# Patient Record
Sex: MALE | Race: WHITE | Employment: FULL TIME | URBAN - METROPOLITAN AREA
[De-identification: names, ages, dates, MRNs, and addresses within clinical notes are randomized per-mention and may not be internally consistent; named-entity substitution may affect disease eponyms.]

---

## 2018-05-30 ENCOUNTER — HOSPITAL ENCOUNTER (EMERGENCY)
Age: 53
Discharge: HOME | End: 2018-05-31
Attending: EMERGENCY MEDICINE | Admitting: INTERNAL MEDICINE
Payer: COMMERCIAL

## 2018-05-30 DIAGNOSIS — K22.2 ESOPHAGEAL OBSTRUCTION DUE TO FOOD IMPACTION: Primary | ICD-10-CM

## 2018-05-30 DIAGNOSIS — T18.128A ESOPHAGEAL OBSTRUCTION DUE TO FOOD IMPACTION: Primary | ICD-10-CM

## 2018-05-30 PROCEDURE — 2500000003 HC RX 250 WO HCPCS: Performed by: EMERGENCY MEDICINE

## 2018-05-30 PROCEDURE — S0028 INJECTION, FAMOTIDINE, 20 MG: HCPCS | Performed by: EMERGENCY MEDICINE

## 2018-05-30 PROCEDURE — 96374 THER/PROPH/DIAG INJ IV PUSH: CPT

## 2018-05-30 PROCEDURE — 96375 TX/PRO/DX INJ NEW DRUG ADDON: CPT

## 2018-05-30 PROCEDURE — 99284 EMERGENCY DEPT VISIT MOD MDM: CPT

## 2018-05-30 PROCEDURE — 6360000002 HC RX W HCPCS: Performed by: EMERGENCY MEDICINE

## 2018-05-30 RX ORDER — MORPHINE SULFATE 8 MG/ML
6 INJECTION, SOLUTION INTRAMUSCULAR; INTRAVENOUS ONCE
Status: COMPLETED | OUTPATIENT
Start: 2018-05-30 | End: 2018-05-30

## 2018-05-30 RX ORDER — ONDANSETRON 2 MG/ML
4 INJECTION INTRAMUSCULAR; INTRAVENOUS ONCE
Status: COMPLETED | OUTPATIENT
Start: 2018-05-30 | End: 2018-05-30

## 2018-05-30 RX ADMIN — GLUCAGON HYDROCHLORIDE 1 MG: KIT at 22:10

## 2018-05-30 RX ADMIN — ONDANSETRON 4 MG: 2 INJECTION INTRAMUSCULAR; INTRAVENOUS at 23:46

## 2018-05-30 RX ADMIN — MORPHINE SULFATE 6 MG: 8 INJECTION INTRAVENOUS at 23:46

## 2018-05-30 RX ADMIN — FAMOTIDINE 20 MG: 10 INJECTION, SOLUTION INTRAVENOUS at 22:10

## 2018-05-30 ASSESSMENT — PAIN SCALES - GENERAL
PAINLEVEL_OUTOF10: 8
PAINLEVEL_OUTOF10: 8

## 2018-05-31 ENCOUNTER — ANESTHESIA (OUTPATIENT)
Dept: OPERATING ROOM | Age: 53
End: 2018-05-31
Payer: COMMERCIAL

## 2018-05-31 ENCOUNTER — ANESTHESIA EVENT (OUTPATIENT)
Dept: OPERATING ROOM | Age: 53
End: 2018-05-31
Payer: COMMERCIAL

## 2018-05-31 VITALS
HEART RATE: 58 BPM | RESPIRATION RATE: 16 BRPM | DIASTOLIC BLOOD PRESSURE: 99 MMHG | OXYGEN SATURATION: 97 % | BODY MASS INDEX: 25.05 KG/M2 | WEIGHT: 175 LBS | SYSTOLIC BLOOD PRESSURE: 153 MMHG | HEIGHT: 70 IN | TEMPERATURE: 97 F

## 2018-05-31 VITALS — SYSTOLIC BLOOD PRESSURE: 127 MMHG | DIASTOLIC BLOOD PRESSURE: 66 MMHG | OXYGEN SATURATION: 100 %

## 2018-05-31 PROCEDURE — 3600007502: Performed by: INTERNAL MEDICINE

## 2018-05-31 PROCEDURE — 2580000003 HC RX 258: Performed by: NURSE ANESTHETIST, CERTIFIED REGISTERED

## 2018-05-31 PROCEDURE — 7100000011 HC PHASE II RECOVERY - ADDTL 15 MIN: Performed by: INTERNAL MEDICINE

## 2018-05-31 PROCEDURE — 3600007512: Performed by: INTERNAL MEDICINE

## 2018-05-31 PROCEDURE — 6360000002 HC RX W HCPCS: Performed by: NURSE ANESTHETIST, CERTIFIED REGISTERED

## 2018-05-31 PROCEDURE — 7100000000 HC PACU RECOVERY - FIRST 15 MIN: Performed by: INTERNAL MEDICINE

## 2018-05-31 PROCEDURE — 7100000001 HC PACU RECOVERY - ADDTL 15 MIN: Performed by: INTERNAL MEDICINE

## 2018-05-31 PROCEDURE — 7100000010 HC PHASE II RECOVERY - FIRST 15 MIN: Performed by: INTERNAL MEDICINE

## 2018-05-31 PROCEDURE — 3700000000 HC ANESTHESIA ATTENDED CARE: Performed by: INTERNAL MEDICINE

## 2018-05-31 PROCEDURE — C1773 RET DEV, INSERTABLE: HCPCS | Performed by: INTERNAL MEDICINE

## 2018-05-31 PROCEDURE — 3700000001 HC ADD 15 MINUTES (ANESTHESIA): Performed by: INTERNAL MEDICINE

## 2018-05-31 RX ORDER — OXYCODONE HYDROCHLORIDE AND ACETAMINOPHEN 5; 325 MG/1; MG/1
1 TABLET ORAL
Status: DISCONTINUED | OUTPATIENT
Start: 2018-05-31 | End: 2018-05-31 | Stop reason: HOSPADM

## 2018-05-31 RX ORDER — ONDANSETRON 2 MG/ML
INJECTION INTRAMUSCULAR; INTRAVENOUS PRN
Status: DISCONTINUED | OUTPATIENT
Start: 2018-05-31 | End: 2018-05-31 | Stop reason: SDUPTHER

## 2018-05-31 RX ORDER — DIPHENHYDRAMINE HYDROCHLORIDE 50 MG/ML
12.5 INJECTION INTRAMUSCULAR; INTRAVENOUS
Status: DISCONTINUED | OUTPATIENT
Start: 2018-05-31 | End: 2018-05-31 | Stop reason: HOSPADM

## 2018-05-31 RX ORDER — MEPERIDINE HYDROCHLORIDE 25 MG/ML
12.5 INJECTION INTRAMUSCULAR; INTRAVENOUS; SUBCUTANEOUS EVERY 5 MIN PRN
Status: DISCONTINUED | OUTPATIENT
Start: 2018-05-31 | End: 2018-05-31 | Stop reason: HOSPADM

## 2018-05-31 RX ORDER — DEXAMETHASONE SODIUM PHOSPHATE 4 MG/ML
INJECTION, SOLUTION INTRA-ARTICULAR; INTRALESIONAL; INTRAMUSCULAR; INTRAVENOUS; SOFT TISSUE PRN
Status: DISCONTINUED | OUTPATIENT
Start: 2018-05-31 | End: 2018-05-31 | Stop reason: SDUPTHER

## 2018-05-31 RX ORDER — PROMETHAZINE HYDROCHLORIDE 25 MG/ML
6.25 INJECTION, SOLUTION INTRAMUSCULAR; INTRAVENOUS
Status: DISCONTINUED | OUTPATIENT
Start: 2018-05-31 | End: 2018-05-31 | Stop reason: HOSPADM

## 2018-05-31 RX ORDER — FENTANYL CITRATE 50 UG/ML
50 INJECTION, SOLUTION INTRAMUSCULAR; INTRAVENOUS EVERY 5 MIN PRN
Status: DISCONTINUED | OUTPATIENT
Start: 2018-05-31 | End: 2018-05-31 | Stop reason: HOSPADM

## 2018-05-31 RX ORDER — FENTANYL CITRATE 50 UG/ML
25 INJECTION, SOLUTION INTRAMUSCULAR; INTRAVENOUS EVERY 5 MIN PRN
Status: DISCONTINUED | OUTPATIENT
Start: 2018-05-31 | End: 2018-05-31 | Stop reason: HOSPADM

## 2018-05-31 RX ORDER — PANTOPRAZOLE SODIUM 40 MG/1
40 TABLET, DELAYED RELEASE ORAL DAILY
Qty: 30 TABLET | Refills: 3 | Status: SHIPPED | OUTPATIENT
Start: 2018-05-31 | End: 2018-11-15 | Stop reason: ALTCHOICE

## 2018-05-31 RX ORDER — FENTANYL CITRATE 50 UG/ML
INJECTION, SOLUTION INTRAMUSCULAR; INTRAVENOUS PRN
Status: DISCONTINUED | OUTPATIENT
Start: 2018-05-31 | End: 2018-05-31 | Stop reason: SDUPTHER

## 2018-05-31 RX ORDER — SODIUM CHLORIDE, SODIUM LACTATE, POTASSIUM CHLORIDE, CALCIUM CHLORIDE 600; 310; 30; 20 MG/100ML; MG/100ML; MG/100ML; MG/100ML
INJECTION, SOLUTION INTRAVENOUS CONTINUOUS PRN
Status: DISCONTINUED | OUTPATIENT
Start: 2018-05-31 | End: 2018-05-31 | Stop reason: SDUPTHER

## 2018-05-31 RX ORDER — PROPOFOL 10 MG/ML
INJECTION, EMULSION INTRAVENOUS PRN
Status: DISCONTINUED | OUTPATIENT
Start: 2018-05-31 | End: 2018-05-31 | Stop reason: SDUPTHER

## 2018-05-31 RX ADMIN — SODIUM CHLORIDE, POTASSIUM CHLORIDE, SODIUM LACTATE AND CALCIUM CHLORIDE: 600; 310; 30; 20 INJECTION, SOLUTION INTRAVENOUS at 01:35

## 2018-05-31 RX ADMIN — ONDANSETRON 4 MG: 2 INJECTION, SOLUTION INTRAMUSCULAR; INTRAVENOUS at 01:36

## 2018-05-31 RX ADMIN — FENTANYL CITRATE 100 MCG: 50 INJECTION, SOLUTION INTRAMUSCULAR; INTRAVENOUS at 01:36

## 2018-05-31 RX ADMIN — PROPOFOL 200 MG: 10 INJECTION, EMULSION INTRAVENOUS at 01:36

## 2018-05-31 RX ADMIN — DEXAMETHASONE SODIUM PHOSPHATE 8 MG: 4 INJECTION, SOLUTION INTRA-ARTICULAR; INTRALESIONAL; INTRAMUSCULAR; INTRAVENOUS; SOFT TISSUE at 01:36

## 2018-05-31 ASSESSMENT — LIFESTYLE VARIABLES: SMOKING_STATUS: 0

## 2018-05-31 ASSESSMENT — PAIN SCALES - GENERAL
PAINLEVEL_OUTOF10: 0

## 2018-05-31 ASSESSMENT — PULMONARY FUNCTION TESTS
PIF_VALUE: 4
PIF_VALUE: 20
PIF_VALUE: 1
PIF_VALUE: 2
PIF_VALUE: 2
PIF_VALUE: 3
PIF_VALUE: 13
PIF_VALUE: 27
PIF_VALUE: 14
PIF_VALUE: 15
PIF_VALUE: 13
PIF_VALUE: 20
PIF_VALUE: 2
PIF_VALUE: 13
PIF_VALUE: 14
PIF_VALUE: 14
PIF_VALUE: 2

## 2018-11-15 PROBLEM — Z78.9 STATIN INTOLERANCE: Status: ACTIVE | Noted: 2018-11-15

## 2018-11-15 PROBLEM — B00.9 HERPETIC LESION: Status: ACTIVE | Noted: 2018-11-15

## 2018-11-19 RX ORDER — AMOXICILLIN 250 MG/1
250 CAPSULE ORAL 3 TIMES DAILY
Qty: 30 CAPSULE | Refills: 0 | Status: SHIPPED | OUTPATIENT
Start: 2018-11-19 | End: 2018-11-29

## 2018-11-19 RX ORDER — METHYLPREDNISOLONE 4 MG/1
TABLET ORAL
Qty: 1 KIT | Refills: 0 | Status: SHIPPED | OUTPATIENT
Start: 2018-11-19 | End: 2018-11-25

## 2019-02-15 ENCOUNTER — HOSPITAL ENCOUNTER (EMERGENCY)
Age: 54
Discharge: HOME | End: 2019-02-15
Attending: EMERGENCY MEDICINE
Payer: COMMERCIAL

## 2019-02-15 ENCOUNTER — APPOINTMENT (OUTPATIENT)
Dept: CT IMAGING | Age: 54
End: 2019-02-15
Payer: COMMERCIAL

## 2019-02-15 VITALS
BODY MASS INDEX: 25.05 KG/M2 | WEIGHT: 175 LBS | DIASTOLIC BLOOD PRESSURE: 67 MMHG | SYSTOLIC BLOOD PRESSURE: 118 MMHG | OXYGEN SATURATION: 98 % | HEIGHT: 70 IN | TEMPERATURE: 98 F | RESPIRATION RATE: 16 BRPM | HEART RATE: 66 BPM

## 2019-02-15 DIAGNOSIS — Z71.1 WORRIED WELL: Primary | ICD-10-CM

## 2019-02-15 LAB
ALBUMIN SERPL-MCNC: 4.6 G/DL (ref 3.5–5.2)
ALP BLD-CCNC: 69 U/L (ref 40–129)
ALT SERPL-CCNC: 35 U/L (ref 0–40)
ANION GAP SERPL CALCULATED.3IONS-SCNC: 12 MMOL/L (ref 7–16)
APTT: 30.4 SEC (ref 24.5–35.1)
AST SERPL-CCNC: 21 U/L (ref 0–39)
BASOPHILS ABSOLUTE: 0.04 E9/L (ref 0–0.2)
BASOPHILS RELATIVE PERCENT: 0.8 % (ref 0–2)
BILIRUB SERPL-MCNC: 0.6 MG/DL (ref 0–1.2)
BILIRUBIN DIRECT: <0.2 MG/DL (ref 0–0.3)
BILIRUBIN URINE: NEGATIVE
BILIRUBIN, INDIRECT: NORMAL MG/DL (ref 0–1)
BLOOD, URINE: NEGATIVE
BUN BLDV-MCNC: 14 MG/DL (ref 6–20)
CALCIUM SERPL-MCNC: 9.7 MG/DL (ref 8.6–10.2)
CHLORIDE BLD-SCNC: 100 MMOL/L (ref 98–107)
CLARITY: CLEAR
CO2: 26 MMOL/L (ref 22–29)
COLOR: YELLOW
CREAT SERPL-MCNC: 1 MG/DL (ref 0.7–1.2)
EOSINOPHILS ABSOLUTE: 0.07 E9/L (ref 0.05–0.5)
EOSINOPHILS RELATIVE PERCENT: 1.5 % (ref 0–6)
GFR AFRICAN AMERICAN: >60
GFR NON-AFRICAN AMERICAN: >60 ML/MIN/1.73
GLUCOSE BLD-MCNC: 106 MG/DL (ref 74–99)
GLUCOSE URINE: NEGATIVE MG/DL
HCT VFR BLD CALC: 43.1 % (ref 37–54)
HEMOGLOBIN: 15 G/DL (ref 12.5–16.5)
IMMATURE GRANULOCYTES #: 0.01 E9/L
IMMATURE GRANULOCYTES %: 0.2 % (ref 0–5)
INR BLD: 1
KETONES, URINE: NEGATIVE MG/DL
LACTIC ACID: 1.7 MMOL/L (ref 0.5–2.2)
LEUKOCYTE ESTERASE, URINE: NEGATIVE
LIPASE: 31 U/L (ref 13–60)
LYMPHOCYTES ABSOLUTE: 1.87 E9/L (ref 1.5–4)
LYMPHOCYTES RELATIVE PERCENT: 39.5 % (ref 20–42)
MCH RBC QN AUTO: 33.1 PG (ref 26–35)
MCHC RBC AUTO-ENTMCNC: 34.8 % (ref 32–34.5)
MCV RBC AUTO: 95.1 FL (ref 80–99.9)
MONOCYTES ABSOLUTE: 0.49 E9/L (ref 0.1–0.95)
MONOCYTES RELATIVE PERCENT: 10.3 % (ref 2–12)
NEUTROPHILS ABSOLUTE: 2.26 E9/L (ref 1.8–7.3)
NEUTROPHILS RELATIVE PERCENT: 47.7 % (ref 43–80)
NITRITE, URINE: NEGATIVE
PDW BLD-RTO: 12.2 FL (ref 11.5–15)
PH UA: 7 (ref 5–9)
PLATELET # BLD: 204 E9/L (ref 130–450)
PMV BLD AUTO: 11.7 FL (ref 7–12)
POTASSIUM REFLEX MAGNESIUM: 4.4 MMOL/L (ref 3.5–5)
PROTEIN UA: NEGATIVE MG/DL
PROTHROMBIN TIME: 11.4 SEC (ref 9.3–12.4)
RBC # BLD: 4.53 E12/L (ref 3.8–5.8)
SODIUM BLD-SCNC: 138 MMOL/L (ref 132–146)
SPECIFIC GRAVITY UA: 1.01 (ref 1–1.03)
TOTAL PROTEIN: 7.5 G/DL (ref 6.4–8.3)
UROBILINOGEN, URINE: 0.2 E.U./DL
WBC # BLD: 4.7 E9/L (ref 4.5–11.5)

## 2019-02-15 PROCEDURE — 85025 COMPLETE CBC W/AUTO DIFF WBC: CPT

## 2019-02-15 PROCEDURE — 80048 BASIC METABOLIC PNL TOTAL CA: CPT

## 2019-02-15 PROCEDURE — 81003 URINALYSIS AUTO W/O SCOPE: CPT

## 2019-02-15 PROCEDURE — 36415 COLL VENOUS BLD VENIPUNCTURE: CPT

## 2019-02-15 PROCEDURE — 99284 EMERGENCY DEPT VISIT MOD MDM: CPT

## 2019-02-15 PROCEDURE — 85730 THROMBOPLASTIN TIME PARTIAL: CPT

## 2019-02-15 PROCEDURE — 83690 ASSAY OF LIPASE: CPT

## 2019-02-15 PROCEDURE — 80076 HEPATIC FUNCTION PANEL: CPT

## 2019-02-15 PROCEDURE — 83605 ASSAY OF LACTIC ACID: CPT

## 2019-02-15 PROCEDURE — 2580000003 HC RX 258: Performed by: EMERGENCY MEDICINE

## 2019-02-15 PROCEDURE — 85610 PROTHROMBIN TIME: CPT

## 2019-02-15 RX ORDER — 0.9 % SODIUM CHLORIDE 0.9 %
1000 INTRAVENOUS SOLUTION INTRAVENOUS ONCE
Status: COMPLETED | OUTPATIENT
Start: 2019-02-15 | End: 2019-02-15

## 2019-02-15 RX ADMIN — SODIUM CHLORIDE 1000 ML: 9 INJECTION, SOLUTION INTRAVENOUS at 11:37

## 2019-02-15 ASSESSMENT — ENCOUNTER SYMPTOMS
VOMITING: 0
COUGH: 0
ABDOMINAL PAIN: 0
NAUSEA: 0
SHORTNESS OF BREATH: 0
CONSTIPATION: 0
DIARRHEA: 0

## 2019-02-15 ASSESSMENT — PAIN DESCRIPTION - DESCRIPTORS: DESCRIPTORS: DISCOMFORT

## 2019-02-15 ASSESSMENT — PAIN DESCRIPTION - PAIN TYPE: TYPE: ACUTE PAIN

## 2019-02-15 ASSESSMENT — PAIN DESCRIPTION - ORIENTATION: ORIENTATION: RIGHT

## 2019-02-15 ASSESSMENT — PAIN SCALES - GENERAL: PAINLEVEL_OUTOF10: 3

## 2019-02-15 ASSESSMENT — PAIN DESCRIPTION - LOCATION: LOCATION: ABDOMEN

## 2021-07-10 ENCOUNTER — HOSPITAL ENCOUNTER (EMERGENCY)
Age: 56
Discharge: HOME | End: 2021-07-10
Attending: EMERGENCY MEDICINE
Payer: COMMERCIAL

## 2021-07-10 VITALS
DIASTOLIC BLOOD PRESSURE: 94 MMHG | WEIGHT: 180 LBS | HEART RATE: 85 BPM | BODY MASS INDEX: 25.77 KG/M2 | OXYGEN SATURATION: 98 % | SYSTOLIC BLOOD PRESSURE: 156 MMHG | TEMPERATURE: 97.6 F | RESPIRATION RATE: 16 BRPM | HEIGHT: 70 IN

## 2021-07-10 DIAGNOSIS — R04.0 EPISTAXIS: Primary | ICD-10-CM

## 2021-07-10 PROCEDURE — 99284 EMERGENCY DEPT VISIT MOD MDM: CPT

## 2021-07-10 PROCEDURE — 6370000000 HC RX 637 (ALT 250 FOR IP): Performed by: PHYSICIAN ASSISTANT

## 2021-07-10 PROCEDURE — 30901 CONTROL OF NOSEBLEED: CPT

## 2021-07-10 PROCEDURE — 2500000003 HC RX 250 WO HCPCS: Performed by: PHYSICIAN ASSISTANT

## 2021-07-10 RX ORDER — TRANEXAMIC ACID 100 MG/ML
1000 INJECTION, SOLUTION INTRAVENOUS ONCE
Status: COMPLETED | OUTPATIENT
Start: 2021-07-10 | End: 2021-07-10

## 2021-07-10 RX ORDER — OXYMETAZOLINE HYDROCHLORIDE 0.05 G/100ML
2 SPRAY NASAL ONCE
Status: COMPLETED | OUTPATIENT
Start: 2021-07-10 | End: 2021-07-10

## 2021-07-10 RX ORDER — COCAINE HYDROCHLORIDE 40 MG/ML
SOLUTION NASAL ONCE
Status: DISCONTINUED | OUTPATIENT
Start: 2021-07-10 | End: 2021-07-10 | Stop reason: HOSPADM

## 2021-07-10 RX ORDER — SIMVASTATIN 5 MG
5 TABLET ORAL EVERY MORNING
COMMUNITY

## 2021-07-10 RX ORDER — SODIUM CHLORIDE/ALOE VERA
GEL (GRAM) NASAL PRN
Qty: 1 TUBE | Refills: 0 | Status: ON HOLD | OUTPATIENT
Start: 2021-07-10 | End: 2021-10-11

## 2021-07-10 RX ADMIN — TRANEXAMIC ACID 1000 MG: 1 INJECTION, SOLUTION INTRAVENOUS at 18:54

## 2021-07-10 RX ADMIN — Medication 2 SPRAY: at 19:43

## 2021-07-10 RX ADMIN — Medication 2 SPRAY: at 19:58

## 2021-07-11 NOTE — ED PROVIDER NOTES
ED Attending  CC: Ene       Tyler [address]  Department of Emergency Medicine   ED  Encounter Note  Admit Date/RoomTime: 7/10/2021  6:23 PM  ED Room: [place] [address]    NAME: Kenna Cordon  : 1965  MRN: [id number]     Chief Complaint:  Epistaxis (NO THINNERS. STATES 3RD NOSE BLEED TODAY. STATES THAT THEY ARE A COMMON THING. STATES THAT IT HAS SLOWED DOWN SOME. )    History of Present Illness        Kenna Cordon is a 54 y.o. old male who presents to the emergency department by private vehicle, for intermittent episodes of non-traumatic right sided nosebleed, which began several hour(s) prior to arrival.  Since onset the symptoms have been unchanged. He takes no blood thinning agents. He has no associated symptoms. Patient states he sees Dr. Liliana Amador and has had nosebleeds like this in the past.  Patient states that his ENT said that he might need the area embolized. Denies fever/chills, headache, vision change, dizziness, chest pain, dyspnea, abdominal pain, NVD, numbness/weakness. ROS   Pertinent positives and negatives are stated within HPI, all other systems reviewed and are negative. Past Medical History:  has a past medical history of Hypertension. Surgical History:  has a past surgical history that includes Appendectomy; Knee arthroscopy; sinus surgery; and pr egd flexible foreign body removal (N/A, 2018). Social History:  reports that he has never smoked. He has never used smokeless tobacco. He reports current alcohol use of about 6.0 standard drinks of alcohol per week. He reports that he does not use drugs. Family History: family history is not on file. Allergies: Patient has no known allergies.     Physical Exam   Oxygen Saturation Interpretation: Normal.        ED Triage Vitals   BP Temp Temp Source Pulse Resp SpO2 Height Weight   07/10/21 1820 07/10/21 1817 07/10/21 1817 07/10/21 1817 07/10/21 1820 07/10/21 1817 07/10/21 1828 07/10/21 1820   (!) 161/95 97.7 °F (36.5 °C) Temporal 71 14 97 % 5' 9.5\" (1.765 m) 180 lb (81.6 kg)         Constitutional:  Alert, development consistent with age. Eyes:  PERRLA, EOM intact. Conjunctiva:  normal.  Nose:        External:                   Swelling: None. Deformity: No.            Tenderness:  none. Skin:  no erythema, rash or wounds noted. Intranasal:  without erythema or discharge. Abrasion: no.            Laceration: no.            Septal Hematoma:  absent. Septal Deviation:  absent. Bleeding:             Status/Amount: mild active bleeding. Site:  From right Naris(es). Source: From posterior. Sinuses: no bilateral maxillary sinus tenderness. no bilateral frontal sinus tenderness. Throat: There is no blood noted in posterior pharynx. Neck:  Normal ROM. Supple. Respiratory:  Clear to auscultation and breath sounds equal.    CV: Regular rate and rhythm, normal heart sounds, without pathological murmurs, ectopy, gallops, or rubs. Integument:  No rashes, erythema present, unless noted elsewhere. Lymphatics: No lymphangitis or adenopathy noted. Neurological:  Oriented. Motor functions intact. Lab / Imaging Results   (All laboratory and radiology results have been personally reviewed by myself)  Labs:  No results found for this visit on 07/10/21. Imaging: All Radiology results interpreted by Radiologist unless otherwise noted.   No orders to display       ED Course / Medical Decision Making     Medications   cocaine 40 MG/ML nasal solution (has no administration in time range)   tranexamic acid (CYKLOKAPRON) injection 1,000 mg (1,000 mg Topical Given 7/10/21 1854)   oxymetazoline (AFRIN) 0.05 % nasal spray 2 spray (2 sprays Each Nostril Given 7/10/21 1943)   oxymetazoline (AFRIN) 0.05 % nasal spray 2 spray (2 sprays Each Nostril Given 7/10/21 1958) Consult(s):   none. Procedure(s):     PROCEDURE NOTE  7/10/21       Time: 2030    NASAL PACKING / CAUTERY  Risks, benefits and alternatives (for applicable procedures below) described. Performed By: MIKAL Evans. Indication:   Right posterior epistaxis. Informed consent: Written consent obtained. The patient was counseled regarding the procedure in person, it's indications, risks, potential complications and alternatives and any questions were answered. Consent was obtained. .  Procedure:  Right nares- placed afrin soaked cotton balls in right nare. Hemostatsis  obtained. Patient tolerated the procedure well. ENT Consultation:  No.         MDM:   Patient presenting with epistaxis. Patient is in no acute distress, afebrile, nontoxic appearance. TXA was used first which was unsuccessful. I then soaked cotton balls with Afrin and placed him in his nose with a nasal clamp for 30 minutes. The combos were then removed which was successful. Patient had no bleeding for 30 minutes after this. Patient is discharged home and recommend follow-up with his ENT. Recommend patient return to the ED with new or worsening of symptoms. Plan of Care/Counseling:  MIKAL Evans reviewed today's visit with the patient in addition to providing specific details for the plan of care and counseling regarding the diagnosis and prognosis. Questions are answered at this time and are agreeable with the plan. Assessment      1. Epistaxis      Plan   Discharged home. Patient condition is stable    New Medications     New Prescriptions    SALINE NASAL GEL (AYR) GEL    by Nasal route as needed for Congestion     Electronically signed by MIKAL Evans   DD: 7/10/21  **This report was transcribed using voice recognition software. Every effort was made to ensure accuracy; however, inadvertent computerized transcription errors may be present.   END OF ED PROVIDER NOTE     Bel Slaughter, MIKAL  07/10/21 8617

## 2021-08-18 ENCOUNTER — APPOINTMENT (OUTPATIENT)
Dept: GENERAL RADIOLOGY | Age: 56
End: 2021-08-18
Payer: COMMERCIAL

## 2021-08-18 ENCOUNTER — HOSPITAL ENCOUNTER (EMERGENCY)
Age: 56
Discharge: HOME | End: 2021-08-18
Payer: COMMERCIAL

## 2021-08-18 VITALS
HEART RATE: 66 BPM | WEIGHT: 180 LBS | TEMPERATURE: 98.4 F | RESPIRATION RATE: 16 BRPM | OXYGEN SATURATION: 98 % | BODY MASS INDEX: 25.77 KG/M2 | DIASTOLIC BLOOD PRESSURE: 102 MMHG | HEIGHT: 70 IN | SYSTOLIC BLOOD PRESSURE: 161 MMHG

## 2021-08-18 DIAGNOSIS — S49.91XA INJURY OF RIGHT SHOULDER, INITIAL ENCOUNTER: Primary | ICD-10-CM

## 2021-08-18 PROCEDURE — 6370000000 HC RX 637 (ALT 250 FOR IP): Performed by: PHYSICIAN ASSISTANT

## 2021-08-18 PROCEDURE — 99283 EMERGENCY DEPT VISIT LOW MDM: CPT

## 2021-08-18 PROCEDURE — 73030 X-RAY EXAM OF SHOULDER: CPT

## 2021-08-18 RX ORDER — HYDROCODONE BITARTRATE AND ACETAMINOPHEN 5; 325 MG/1; MG/1
1 TABLET ORAL ONCE
Status: COMPLETED | OUTPATIENT
Start: 2021-08-18 | End: 2021-08-18

## 2021-08-18 RX ORDER — IBUPROFEN 600 MG/1
600 TABLET ORAL ONCE
Status: COMPLETED | OUTPATIENT
Start: 2021-08-18 | End: 2021-08-18

## 2021-08-18 RX ORDER — NAPROXEN 500 MG/1
500 TABLET ORAL 2 TIMES DAILY
Qty: 14 TABLET | Refills: 0 | Status: ON HOLD | OUTPATIENT
Start: 2021-08-18 | End: 2021-10-11

## 2021-08-18 RX ORDER — HYDROCODONE BITARTRATE AND ACETAMINOPHEN 5; 325 MG/1; MG/1
1 TABLET ORAL EVERY 8 HOURS PRN
Qty: 9 TABLET | Refills: 0 | Status: SHIPPED | OUTPATIENT
Start: 2021-08-18 | End: 2021-08-21

## 2021-08-18 RX ADMIN — IBUPROFEN 600 MG: 600 TABLET, FILM COATED ORAL at 15:18

## 2021-08-18 RX ADMIN — HYDROCODONE BITARTRATE AND ACETAMINOPHEN 1 TABLET: 5; 325 TABLET ORAL at 15:18

## 2021-08-18 ASSESSMENT — PAIN DESCRIPTION - PAIN TYPE: TYPE: ACUTE PAIN

## 2021-08-18 ASSESSMENT — PAIN DESCRIPTION - FREQUENCY: FREQUENCY: CONTINUOUS

## 2021-08-18 ASSESSMENT — PAIN SCALES - GENERAL
PAINLEVEL_OUTOF10: 8
PAINLEVEL_OUTOF10: 8

## 2021-08-18 ASSESSMENT — PAIN DESCRIPTION - DESCRIPTORS: DESCRIPTORS: ACHING;CONSTANT

## 2021-08-18 ASSESSMENT — PAIN DESCRIPTION - LOCATION: LOCATION: SHOULDER

## 2021-08-18 ASSESSMENT — PAIN DESCRIPTION - ORIENTATION: ORIENTATION: RIGHT

## 2021-08-18 ASSESSMENT — PAIN DESCRIPTION - ONSET: ONSET: GRADUAL

## 2021-08-18 ASSESSMENT — PAIN DESCRIPTION - PROGRESSION: CLINICAL_PROGRESSION: GRADUALLY WORSENING

## 2021-08-19 NOTE — ED PROVIDER NOTES
[facility]  Department of Emergency Medicine   ED  Encounter Note  Admit Date/RoomTime: 2021  2:25 PM  ED Room: 36/    NAME: Renetta Mendiola  : 1965  MRN: [id number]     Chief Complaint:  Shoulder Injury (right )    History of Present Illness       Renetta Mendiola is a 54 y.o. old male who presents to the emergency department by private vehicle, for persistent right shoulder pain after injury today. Patient states he was adjusting a picture on the wall when he twisted his arm trying to catch the painting. Patient states his symptoms are mild in severity describes as aching pain. Patient states the pain is worse with movement. Patient denies anything making it better. Patient denies previous injury or surgery. Denies fever/chills, headache, vision change, dizziness, chest pain, dyspnea, abdominal pain, NVD, numbness/weakness. ROS   Pertinent positives and negatives are stated within HPI, all other systems reviewed and are negative. Past Medical History:  has a past medical history of Hypertension. Surgical History:  has a past surgical history that includes Appendectomy; Knee arthroscopy; sinus surgery; and pr egd flexible foreign body removal (N/A, 2018). Social History:  reports that he has never smoked. He has never used smokeless tobacco. He reports current alcohol use of about 6.0 standard drinks of alcohol per week. He reports that he does not use drugs. Family History: family history is not on file. Allergies: Patient has no known allergies. Physical Exam   Oxygen Saturation Interpretation: Normal.        ED Triage Vitals [21 1436]   BP Temp Temp src Pulse Resp SpO2 Height Weight   (!) 161/102 98.4 °F (36.9 °C) -- 66 16 98 % 5' 10\" (1.778 m) 180 lb (81.6 kg)         Constitutional:  Alert, development consistent with age. Neck:  Normal ROM. Supple. Non-tender. Right Shoulder: posterior, lateral, AC joint. Tenderness: mild              Swelling: None. Deformity: no deformity observed/palpated. ROM: diminished range with pain. Skin:  no wounds, erythema, or swelling. Neurovascular: Motor deficit: none. Sensory deficit: none. Pulse deficit: none. Capillary refill: normal.    Right Elbow:             Tenderness:  none. Swelling: None. Deformity: no deformity observed/palpated. ROM: full painless ROM. Skin:  no wounds, erythema, or swelling. Lymphatics: No lymphangitis or edema noted  Neurological:  Oriented. Motor functions intact. Lab / Imaging Results   (All laboratory and radiology results have been personally reviewed by myself)  Labs:  No results found for this visit on 08/18/21. Imaging: All Radiology results interpreted by Radiologist unless otherwise noted. XR SHOULDER RIGHT (MIN 2 VIEWS)   Final Result   No evidence of shoulder fracture or dislocation. ED Course / Medical Decision Making     Medications   HYDROcodone-acetaminophen (NORCO) 5-325 MG per tablet 1 tablet (1 tablet Oral Given 8/18/21 1518)   ibuprofen (ADVIL;MOTRIN) tablet 600 mg (600 mg Oral Given 8/18/21 1518)          Consult(s):   None    Procedure(s):   none    MDM:      Patient presenting with right shoulder pain. Patient is in no acute distress, afebrile, nontoxic appearance. Patient shoulder x-ray is negative for any acute findings. Patient will be placed in a sling and recommend follow-up with orthopedics. Patient was sent home with pain medication. Recommend patient return to the ED with new or worsening of symptoms. Plan of Care/Counseling:  MIKAL Evans reviewed today's visit with the patient in addition to providing specific details for the plan of care and counseling regarding the diagnosis and prognosis.   Questions are answered at this time and are agreeable with the plan. Assessment      1. Injury of right shoulder, initial encounter      Plan   Discharged home. Patient condition is stable    New Medications     Discharge Medication List as of 8/18/2021  4:15 PM      START taking these medications    Details   HYDROcodone-acetaminophen (NORCO) 5-325 MG per tablet Take 1 tablet by mouth every 8 hours as needed for Pain for up to 3 days. Intended supply: 3 days. Take lowest dose possible to manage pain, Disp-9 tablet, R-0Print      naproxen (NAPROSYN) 500 MG tablet Take 1 tablet by mouth 2 times daily for 7 days, Disp-14 tablet, R-0Print           Electronically signed by Judy Perales PA-C   DD: 8/19/21  **This report was transcribed using voice recognition software. Every effort was made to ensure accuracy; however, inadvertent computerized transcription errors may be present.   END OF ED PROVIDER NOTE        Judy Perales PA-C  08/19/21 9046

## 2021-10-10 ENCOUNTER — APPOINTMENT (OUTPATIENT)
Dept: GENERAL RADIOLOGY | Age: 56
End: 2021-10-10
Payer: COMMERCIAL

## 2021-10-10 ENCOUNTER — HOSPITAL ENCOUNTER (OUTPATIENT)
Age: 56
Setting detail: OBSERVATION
Discharge: HOME | End: 2021-10-11
Attending: INTERNAL MEDICINE | Admitting: INTERNAL MEDICINE
Payer: COMMERCIAL

## 2021-10-10 DIAGNOSIS — N52.9 ERECTILE DYSFUNCTION, UNSPECIFIED ERECTILE DYSFUNCTION TYPE: ICD-10-CM

## 2021-10-10 DIAGNOSIS — T18.108A FOREIGN BODY IN ESOPHAGUS, INITIAL ENCOUNTER: Primary | ICD-10-CM

## 2021-10-10 LAB
ALBUMIN SERPL-MCNC: 4.5 G/DL (ref 3.5–5.2)
ALP BLD-CCNC: 71 U/L (ref 40–129)
ALT SERPL-CCNC: 44 U/L (ref 0–40)
AMPHETAMINE SCREEN, URINE: NOT DETECTED
ANION GAP SERPL CALCULATED.3IONS-SCNC: 16 MMOL/L (ref 7–16)
AST SERPL-CCNC: 34 U/L (ref 0–39)
BARBITURATE SCREEN URINE: NOT DETECTED
BASOPHILS ABSOLUTE: 0.06 E9/L (ref 0–0.2)
BASOPHILS RELATIVE PERCENT: 0.9 % (ref 0–2)
BENZODIAZEPINE SCREEN, URINE: NOT DETECTED
BILIRUB SERPL-MCNC: 0.3 MG/DL (ref 0–1.2)
BILIRUBIN URINE: NEGATIVE
BLOOD, URINE: NEGATIVE
BUN BLDV-MCNC: 12 MG/DL (ref 6–20)
CALCIUM SERPL-MCNC: 9.6 MG/DL (ref 8.6–10.2)
CANNABINOID SCREEN URINE: NOT DETECTED
CHLORIDE BLD-SCNC: 101 MMOL/L (ref 98–107)
CLARITY: CLEAR
CO2: 19 MMOL/L (ref 22–29)
COCAINE METABOLITE SCREEN URINE: NOT DETECTED
COLOR: YELLOW
CREAT SERPL-MCNC: 0.9 MG/DL (ref 0.7–1.2)
EOSINOPHILS ABSOLUTE: 0.1 E9/L (ref 0.05–0.5)
EOSINOPHILS RELATIVE PERCENT: 1.5 % (ref 0–6)
FENTANYL SCREEN, URINE: NOT DETECTED
GFR AFRICAN AMERICAN: >60
GFR NON-AFRICAN AMERICAN: >60 ML/MIN/1.73
GLUCOSE BLD-MCNC: 93 MG/DL (ref 74–99)
GLUCOSE URINE: NEGATIVE MG/DL
HCT VFR BLD CALC: 37.7 % (ref 37–54)
HEMOGLOBIN: 13.6 G/DL (ref 12.5–16.5)
IMMATURE GRANULOCYTES #: 0.01 E9/L
IMMATURE GRANULOCYTES %: 0.2 % (ref 0–5)
KETONES, URINE: NEGATIVE MG/DL
LEUKOCYTE ESTERASE, URINE: NEGATIVE
LYMPHOCYTES ABSOLUTE: 3.51 E9/L (ref 1.5–4)
LYMPHOCYTES RELATIVE PERCENT: 53.5 % (ref 20–42)
Lab: NORMAL
MCH RBC QN AUTO: 32.9 PG (ref 26–35)
MCHC RBC AUTO-ENTMCNC: 36.1 % (ref 32–34.5)
MCV RBC AUTO: 91.1 FL (ref 80–99.9)
METHADONE SCREEN, URINE: NOT DETECTED
MONOCYTES ABSOLUTE: 0.67 E9/L (ref 0.1–0.95)
MONOCYTES RELATIVE PERCENT: 10.2 % (ref 2–12)
NEUTROPHILS ABSOLUTE: 2.21 E9/L (ref 1.8–7.3)
NEUTROPHILS RELATIVE PERCENT: 33.7 % (ref 43–80)
NITRITE, URINE: NEGATIVE
OPIATE SCREEN URINE: NOT DETECTED
OXYCODONE URINE: NOT DETECTED
PDW BLD-RTO: 12.4 FL (ref 11.5–15)
PH UA: 7 (ref 5–9)
PHENCYCLIDINE SCREEN URINE: NOT DETECTED
PLATELET # BLD: 243 E9/L (ref 130–450)
PMV BLD AUTO: 10.8 FL (ref 7–12)
POTASSIUM SERPL-SCNC: 3.9 MMOL/L (ref 3.5–5)
PROTEIN UA: NEGATIVE MG/DL
RBC # BLD: 4.14 E12/L (ref 3.8–5.8)
SODIUM BLD-SCNC: 136 MMOL/L (ref 132–146)
SPECIFIC GRAVITY UA: <=1.005 (ref 1–1.03)
TOTAL PROTEIN: 7.5 G/DL (ref 6.4–8.3)
UROBILINOGEN, URINE: 0.2 E.U./DL
WBC # BLD: 6.6 E9/L (ref 4.5–11.5)

## 2021-10-10 PROCEDURE — 80307 DRUG TEST PRSMV CHEM ANLYZR: CPT

## 2021-10-10 PROCEDURE — 6370000000 HC RX 637 (ALT 250 FOR IP): Performed by: PHYSICIAN ASSISTANT

## 2021-10-10 PROCEDURE — 81003 URINALYSIS AUTO W/O SCOPE: CPT

## 2021-10-10 PROCEDURE — 70360 X-RAY EXAM OF NECK: CPT

## 2021-10-10 PROCEDURE — 80053 COMPREHEN METABOLIC PANEL: CPT

## 2021-10-10 PROCEDURE — 99284 EMERGENCY DEPT VISIT MOD MDM: CPT

## 2021-10-10 PROCEDURE — 71045 X-RAY EXAM CHEST 1 VIEW: CPT

## 2021-10-10 PROCEDURE — 80179 DRUG ASSAY SALICYLATE: CPT

## 2021-10-10 PROCEDURE — 82077 ASSAY SPEC XCP UR&BREATH IA: CPT

## 2021-10-10 PROCEDURE — G0378 HOSPITAL OBSERVATION PER HR: HCPCS

## 2021-10-10 PROCEDURE — 80143 DRUG ASSAY ACETAMINOPHEN: CPT

## 2021-10-10 PROCEDURE — 85025 COMPLETE CBC W/AUTO DIFF WBC: CPT

## 2021-10-10 PROCEDURE — 2580000003 HC RX 258: Performed by: PHYSICIAN ASSISTANT

## 2021-10-10 RX ORDER — 0.9 % SODIUM CHLORIDE 0.9 %
1000 INTRAVENOUS SOLUTION INTRAVENOUS ONCE
Status: COMPLETED | OUTPATIENT
Start: 2021-10-10 | End: 2021-10-10

## 2021-10-10 RX ADMIN — SODIUM CHLORIDE 1000 ML: 9 INJECTION, SOLUTION INTRAVENOUS at 22:43

## 2021-10-10 RX ADMIN — LIDOCAINE HYDROCHLORIDE: 20 SOLUTION ORAL; TOPICAL at 22:40

## 2021-10-10 ASSESSMENT — PAIN SCALES - GENERAL: PAINLEVEL_OUTOF10: 10

## 2021-10-10 ASSESSMENT — PAIN DESCRIPTION - PAIN TYPE: TYPE: ACUTE PAIN

## 2021-10-10 ASSESSMENT — PAIN DESCRIPTION - LOCATION: LOCATION: THROAT

## 2021-10-10 NOTE — LETTER
[address] [place] [id number]  Phone: [phone]    No name on file. October 12, 2021     Patient: Parris Keller   YOB: 1965   Date of Visit: 10/10/2021       To Whom It May Concern: It is my medical opinion that Yara Zamoras may return to full duty immediately with no restrictions. If you have any questions or concerns, please don't hesitate to call.     Sincerely,  Dr. Georgette Rhodes

## 2021-10-11 ENCOUNTER — ANESTHESIA (OUTPATIENT)
Dept: OPERATING ROOM | Age: 56
End: 2021-10-11
Payer: COMMERCIAL

## 2021-10-11 ENCOUNTER — ANESTHESIA EVENT (OUTPATIENT)
Dept: OPERATING ROOM | Age: 56
End: 2021-10-11
Payer: COMMERCIAL

## 2021-10-11 VITALS
OXYGEN SATURATION: 98 % | SYSTOLIC BLOOD PRESSURE: 142 MMHG | TEMPERATURE: 98 F | BODY MASS INDEX: 25.15 KG/M2 | HEART RATE: 85 BPM | WEIGHT: 175.7 LBS | DIASTOLIC BLOOD PRESSURE: 85 MMHG | HEIGHT: 70 IN | RESPIRATION RATE: 18 BRPM

## 2021-10-11 VITALS
SYSTOLIC BLOOD PRESSURE: 121 MMHG | OXYGEN SATURATION: 100 % | RESPIRATION RATE: 16 BRPM | DIASTOLIC BLOOD PRESSURE: 64 MMHG

## 2021-10-11 PROBLEM — Z87.19 HISTORY OF ESOPHAGEAL STRICTURE: Status: ACTIVE | Noted: 2021-10-11

## 2021-10-11 PROBLEM — Z98.890 HISTORY OF ESOPHAGEAL DILATATION: Status: ACTIVE | Noted: 2021-10-11

## 2021-10-11 LAB
ACETAMINOPHEN LEVEL: <5 MCG/ML (ref 10–30)
ETHANOL: 95 MG/DL (ref 0–0.08)
SALICYLATE, SERUM: <0.3 MG/DL (ref 0–30)
TRICYCLIC ANTIDEPRESSANTS SCREEN SERUM: NEGATIVE NG/ML

## 2021-10-11 PROCEDURE — 7100000011 HC PHASE II RECOVERY - ADDTL 15 MIN: Performed by: INTERNAL MEDICINE

## 2021-10-11 PROCEDURE — 2580000003 HC RX 258: Performed by: INTERNAL MEDICINE

## 2021-10-11 PROCEDURE — G0378 HOSPITAL OBSERVATION PER HR: HCPCS

## 2021-10-11 PROCEDURE — 2580000003 HC RX 258

## 2021-10-11 PROCEDURE — 3600007502: Performed by: INTERNAL MEDICINE

## 2021-10-11 PROCEDURE — 2709999900 HC NON-CHARGEABLE SUPPLY: Performed by: INTERNAL MEDICINE

## 2021-10-11 PROCEDURE — 2500000003 HC RX 250 WO HCPCS

## 2021-10-11 PROCEDURE — 6370000000 HC RX 637 (ALT 250 FOR IP): Performed by: INTERNAL MEDICINE

## 2021-10-11 PROCEDURE — 3700000000 HC ANESTHESIA ATTENDED CARE: Performed by: INTERNAL MEDICINE

## 2021-10-11 PROCEDURE — 7100000010 HC PHASE II RECOVERY - FIRST 15 MIN: Performed by: INTERNAL MEDICINE

## 2021-10-11 PROCEDURE — 6360000002 HC RX W HCPCS

## 2021-10-11 RX ORDER — SODIUM CHLORIDE 0.9 % (FLUSH) 0.9 %
5-40 SYRINGE (ML) INJECTION EVERY 12 HOURS SCHEDULED
Status: DISCONTINUED | OUTPATIENT
Start: 2021-10-11 | End: 2021-10-11 | Stop reason: HOSPADM

## 2021-10-11 RX ORDER — SODIUM CHLORIDE 9 MG/ML
INJECTION, SOLUTION INTRAVENOUS CONTINUOUS PRN
Status: DISCONTINUED | OUTPATIENT
Start: 2021-10-11 | End: 2021-10-11 | Stop reason: SDUPTHER

## 2021-10-11 RX ORDER — ONDANSETRON 2 MG/ML
4 INJECTION INTRAMUSCULAR; INTRAVENOUS EVERY 6 HOURS PRN
Status: DISCONTINUED | OUTPATIENT
Start: 2021-10-11 | End: 2021-10-11 | Stop reason: HOSPADM

## 2021-10-11 RX ORDER — LABETALOL HYDROCHLORIDE 5 MG/ML
5 INJECTION, SOLUTION INTRAVENOUS EVERY 10 MIN PRN
Status: DISCONTINUED | OUTPATIENT
Start: 2021-10-11 | End: 2021-10-11 | Stop reason: HOSPADM

## 2021-10-11 RX ORDER — MEPERIDINE HYDROCHLORIDE 25 MG/ML
12.5 INJECTION INTRAMUSCULAR; INTRAVENOUS; SUBCUTANEOUS EVERY 5 MIN PRN
Status: DISCONTINUED | OUTPATIENT
Start: 2021-10-11 | End: 2021-10-11 | Stop reason: HOSPADM

## 2021-10-11 RX ORDER — SODIUM CHLORIDE 0.9 % (FLUSH) 0.9 %
5-40 SYRINGE (ML) INJECTION PRN
Status: DISCONTINUED | OUTPATIENT
Start: 2021-10-11 | End: 2021-10-11 | Stop reason: HOSPADM

## 2021-10-11 RX ORDER — ONDANSETRON 4 MG/1
4 TABLET, ORALLY DISINTEGRATING ORAL EVERY 8 HOURS PRN
Status: DISCONTINUED | OUTPATIENT
Start: 2021-10-11 | End: 2021-10-11 | Stop reason: HOSPADM

## 2021-10-11 RX ORDER — PANTOPRAZOLE SODIUM 40 MG/1
40 TABLET, DELAYED RELEASE ORAL
Status: DISCONTINUED | OUTPATIENT
Start: 2021-10-11 | End: 2021-10-11 | Stop reason: HOSPADM

## 2021-10-11 RX ORDER — SODIUM CHLORIDE 9 MG/ML
25 INJECTION, SOLUTION INTRAVENOUS PRN
Status: DISCONTINUED | OUTPATIENT
Start: 2021-10-11 | End: 2021-10-11 | Stop reason: HOSPADM

## 2021-10-11 RX ORDER — PANTOPRAZOLE SODIUM 40 MG/1
40 TABLET, DELAYED RELEASE ORAL
Qty: 30 TABLET | Refills: 0 | Status: SHIPPED | OUTPATIENT
Start: 2021-10-12

## 2021-10-11 RX ORDER — GLYCOPYRROLATE 1 MG/5 ML
SYRINGE (ML) INTRAVENOUS PRN
Status: DISCONTINUED | OUTPATIENT
Start: 2021-10-11 | End: 2021-10-11 | Stop reason: SDUPTHER

## 2021-10-11 RX ORDER — PROPOFOL 10 MG/ML
INJECTION, EMULSION INTRAVENOUS PRN
Status: DISCONTINUED | OUTPATIENT
Start: 2021-10-11 | End: 2021-10-11 | Stop reason: SDUPTHER

## 2021-10-11 RX ORDER — TADALAFIL 10 MG/1
TABLET ORAL
Qty: 30 TABLET | Refills: 3 | Status: SHIPPED | OUTPATIENT
Start: 2021-10-11

## 2021-10-11 RX ORDER — ACETAMINOPHEN 325 MG/1
650 TABLET ORAL EVERY 4 HOURS PRN
Status: DISCONTINUED | OUTPATIENT
Start: 2021-10-11 | End: 2021-10-11 | Stop reason: HOSPADM

## 2021-10-11 RX ORDER — PROMETHAZINE HYDROCHLORIDE 25 MG/ML
25 INJECTION, SOLUTION INTRAMUSCULAR; INTRAVENOUS PRN
Status: DISCONTINUED | OUTPATIENT
Start: 2021-10-11 | End: 2021-10-11 | Stop reason: HOSPADM

## 2021-10-11 RX ORDER — LIDOCAINE HYDROCHLORIDE 20 MG/ML
INJECTION, SOLUTION INFILTRATION; PERINEURAL PRN
Status: DISCONTINUED | OUTPATIENT
Start: 2021-10-11 | End: 2021-10-11 | Stop reason: SDUPTHER

## 2021-10-11 RX ADMIN — SODIUM CHLORIDE, PRESERVATIVE FREE 10 ML: 5 INJECTION INTRAVENOUS at 08:13

## 2021-10-11 RX ADMIN — PANTOPRAZOLE SODIUM 40 MG: 40 TABLET, DELAYED RELEASE ORAL at 13:54

## 2021-10-11 RX ADMIN — PROPOFOL 250 MG: 10 INJECTION, EMULSION INTRAVENOUS at 12:40

## 2021-10-11 RX ADMIN — LIDOCAINE HYDROCHLORIDE 80 MG: 20 INJECTION, SOLUTION INFILTRATION; PERINEURAL at 12:40

## 2021-10-11 RX ADMIN — SODIUM CHLORIDE: 9 INJECTION, SOLUTION INTRAVENOUS at 12:37

## 2021-10-11 RX ADMIN — Medication 0.2 MG: at 12:40

## 2021-10-11 ASSESSMENT — PULMONARY FUNCTION TESTS
PIF_VALUE: 0
PIF_VALUE: 0
PIF_VALUE: 1
PIF_VALUE: 0
PIF_VALUE: 0
PIF_VALUE: 1
PIF_VALUE: 0
PIF_VALUE: 0
PIF_VALUE: 1

## 2021-10-11 ASSESSMENT — PAIN DESCRIPTION - ORIENTATION: ORIENTATION: ANTERIOR

## 2021-10-11 ASSESSMENT — PAIN DESCRIPTION - PROGRESSION
CLINICAL_PROGRESSION: NOT CHANGED
CLINICAL_PROGRESSION: NOT CHANGED

## 2021-10-11 ASSESSMENT — PAIN DESCRIPTION - ONSET
ONSET: ON-GOING
ONSET: ON-GOING

## 2021-10-11 ASSESSMENT — LIFESTYLE VARIABLES: SMOKING_STATUS: 0

## 2021-10-11 ASSESSMENT — PAIN - FUNCTIONAL ASSESSMENT
PAIN_FUNCTIONAL_ASSESSMENT: ACTIVITIES ARE NOT PREVENTED
PAIN_FUNCTIONAL_ASSESSMENT: ACTIVITIES ARE NOT PREVENTED

## 2021-10-11 ASSESSMENT — PAIN SCALES - GENERAL
PAINLEVEL_OUTOF10: 0
PAINLEVEL_OUTOF10: 3
PAINLEVEL_OUTOF10: 2

## 2021-10-11 ASSESSMENT — PAIN DESCRIPTION - DESCRIPTORS
DESCRIPTORS: SORE
DESCRIPTORS: DISCOMFORT

## 2021-10-11 ASSESSMENT — PAIN DESCRIPTION - FREQUENCY
FREQUENCY: CONTINUOUS
FREQUENCY: INTERMITTENT

## 2021-10-11 ASSESSMENT — PAIN DESCRIPTION - LOCATION
LOCATION: THROAT
LOCATION: OTHER (COMMENT)

## 2021-10-11 ASSESSMENT — PAIN DESCRIPTION - PAIN TYPE
TYPE: ACUTE PAIN
TYPE: ACUTE PAIN

## 2021-10-11 NOTE — PATIENT CARE CONFERENCE
[facility] Quality Flow/Interdisciplinary Rounds Progress Note        Quality Flow Rounds held on October 11, 2021    Disciplines Attending:  Bedside Nurse, ,  and Nursing Unit [address] Malik Ruiz was admitted on 10/10/2021  7:47 PM    Anticipated Discharge Date:  Expected Discharge Date: 10/14/21    Disposition:    Neeraj Score:  Neeraj Scale Score: 21    Readmission Risk              Risk of Unplanned Readmission:  0           Discussed patient goal for the day, patient clinical progression, and barriers to discharge.   The following Goal(s) of the Day/Commitment(s) have been identified:  Diagnostics - Report Results      Karla Enriquez RN  October 11, 2021

## 2021-10-11 NOTE — PROGRESS NOTES
Tolerating secretions  Hx meat impactions, first about 20 years ago. Last 2018  Mild stricture. There have been more frequent episodes of close calls recently but eating lamb and EtOH level . 97. Spoke to ER last nite,began to tolerate secretions and tolerated water but patient said it was only capful. I said no discharge unless convinced cleared. Was not notified of admission. Looked for him on my schedule  Still sx but tolerating secretions  No fever or cough    Protocol now dictates OR + intubation. Endoscopy notified and arranging with OR.  Time indeterminate at this time

## 2021-10-11 NOTE — CONSULTS
[address] [address] Tameka Ulrich [address] [place] [address]                                  CONSULTATION    PATIENT NAME: Henry Holman                        :        1965  MED REC NO:   [id number]                            ROOM:       0501  ACCOUNT NO:   [de-identified]                           ADMIT DATE: 10/10/2021  PROVIDER:     Lois Almeida MD    CONSULT DATE:  10/11/2021    REASON FOR CONSULTATION:  Esophageal meat impaction. HISTORY OF PRESENT ILLNESS:  This is a 22-year-old male. He had  originally presented back in  to the emergency room with an  esophageal foreign body, which passed spontaneously. An esophagram  revealed only reflux. Endoscopic evaluation revealed moderate peptic  esophagitis with a mild stricture. He underwent endoscopic evaluation  and dilatation with resolution of symptoms. He was maintained on proton  pump inhibitors to lost to followup several years later. He presented again without proton pump inhibitor therapy back in . At that time, again, he had an esophageal meat impaction that required  an endoscopic evaluation for removal.  Underlying this was a mild  stricture. He returned for an endoscopic evaluation and dilatation,  which was completed in 2018. There was no active esophagitis and a  mild stricture, which was dilated by balloons to 18 mm and Alas  dilators to [address] [place]. He has remained off of proton pump inhibitor  therapy over time. He is followed by his primary care provider for  hypertension and hyperlipidemia. He presented to the emergency room yesterday with foreign body and  inability to handle secretions initially. He had been eating lamb. He  was celebrating his one-year anniversary. His alcohol level was 0.97. Chest x-ray was negative.   I received a phone call from the nurse  practitioner last evening suggesting there was at least a high  probability that the foreign body was passing or passed. He was  tolerating his secretions and tolerating water. I advised then not to  discharge him unless they were 579% certain that it had been removed and  I never heard again. I found him on my schedule this morning having  been admitted. He still feels discomfort at the base of his throat. He  is n.p.o. He has had stable vital signs overnight. PAST MEDICAL HISTORY:  Includes a colonoscopy by Dr. Amber Lantigua in  2014. EGD on several occasions in the past and the last was in 2018  with a foreign body removal and subsequent esophageal dilatation. SOCIAL HISTORY:  He is . He never smoked cigarettes and averages  four to six beers per day per Epic. LABORATORY WORK:  Reveals a normal comprehensive metabolic panel with  the exception of an ALT of 44. He has white count of 6.6 without a  shift. Hematocrit is 38. MCV is 91. IMAGING DATA:  He has had a chest x-ray, which was unrevealing. Films  of the neck were negative for any definite abnormality. PHYSICAL EXAMINATION:  GENERAL:  He is afebrile, lying supine. N.p.o.  HEAD AND NECK:  Reveal no crepitus, adenopathy, or neck vein elevation. Sclerae and conjunctivae are normal.  HEART:  Normal.  LUNGS:  Normal.  ABDOMEN:  Normal.    ASSESSMENT:  Retained esophageal foreign body. I suspect a large piece  of lamb will be found. Tolerating secretions. Endoscopic evaluation  needs to be done this morning. I have alerted Endoscopy, but hospital  protocol says OR plus intubation. It will be done on a time available  basis. PLAN:  EGD. Dilatation today or in followup.         Melinda Bennett MD    D: 10/11/2021 8:48:36       T: 10/11/2021 8:54:08     [email or web address]  Job#: [id number]     Doc#: [id number]    CC:  Jeannette Chaparro MD

## 2021-10-11 NOTE — PROGRESS NOTES
Patient report called to 5th floor RN; family waiting room notified of patient transport. Lala Pelayo RN.

## 2021-10-11 NOTE — CARE COORDINATION
Introduced my self and provided explanation of CM role to patient. Patient is awake, alert, and aware of current diagnosis and treatment plan including OR/Endoscopy removal of foreign body. Patient verbalizes no concerns and identifies no areas to focus on nor barriers to discharge. He states he is independent with his adl's. He plans on a return to home at time of discharge. Patient is established with a pcp and denies any issue with retail pharmaceutical coverage. Krissy Ro.  Antoni, MSN, RN  [facility] Case Management  [phone]

## 2021-10-11 NOTE — ANESTHESIA PRE PROCEDURE
Department of Anesthesiology  Preprocedure Note       Name:  Darrell Laguna   Age:  64 y.o.  :  1965                                          MRN:  [id number]         Date:  10/11/2021      Surgeon: Kerri Velásquez):  Robe Pittman MD    Procedure: Procedure(s):  EGD FOREIGN BODY REMOVAL   (REQ. 2PM)  (NEEDS ENDO STAFF)    Medications prior to admission:   Prior to Admission medications    Medication Sig Start Date End Date Taking?  Authorizing Provider   simvastatin (ZOCOR) 5 MG tablet Take 5 mg by mouth every morning    Yes Historical Provider, MD   lisinopril (PRINIVIL;ZESTRIL) 10 MG tablet TAKE 1 TABLET DAILY 20  Yes Sophronia Epley, MD   allopurinol (ZYLOPRIM) 100 MG tablet TAKE 1 TABLET DAILY 20  Yes Sophronia Epley, MD       Current medications:    Current Facility-Administered Medications   Medication Dose Route Frequency Provider Last Rate Last Admin    sodium chloride flush 0.9 % injection 5-40 mL  5-40 mL IntraVENous 2 times per day Wilberto Alvarez MD   10 mL at 10/11/21 0813    sodium chloride flush 0.9 % injection 5-40 mL  5-40 mL IntraVENous PRN Wilberto Alvarez MD        0.9 % sodium chloride infusion  25 mL IntraVENous PRN Wilberto Alvarez MD        acetaminophen (TYLENOL) tablet 650 mg  650 mg Oral Q4H PRN Wilberto Alvarez MD        ondansetron (ZOFRAN-ODT) disintegrating tablet 4 mg  4 mg Oral Q8H PRN Wilberto Alvarez MD        Or    ondansetron [facility]) injection 4 mg  4 mg IntraVENous Q6H PRN Wilberto Alvarez MD           Allergies:  No Known Allergies    Problem List:    Patient Active Problem List   Diagnosis Code    Hypertension I10    Statin intolerance Z78.9    Herpetic lesion B00.9    Esophageal foreign body, initial encounter T18.108A    History of esophageal stricture Z87.19    History of esophageal dilatation Z98.890       Past Medical History:        Diagnosis Date    Hypertension        Past Surgical History:        Procedure Laterality Date    APPENDECTOMY      KNEE ARTHROSCOPY      [place] EGD FLEXIBLE FOREIGN BODY REMOVAL N/A 5/31/2018    EGD FOREIGN BODY REMOVAL performed by Tracy Peacock MD at [address] [facility] History:    Social History     Tobacco Use    Smoking status: Never Smoker    Smokeless tobacco: Never Used   Substance Use Topics    Alcohol use: Yes     Alcohol/week: 6.0 standard drinks     Types: 6 Cans of beer per week     Comment: 4-6 beers per day                                Counseling given: Not Answered      Vital Signs (Current):   Vitals:    10/11/21 0030 10/11/21 0100 10/11/21 0630 10/11/21 0745   BP: 115/69   136/69   Pulse: 62   54   Resp: 16   14   Temp: 36.5 °C (97.7 °F)   37 °C (98.6 °F)   TempSrc: Oral   Oral   SpO2: 98%   97%   Weight:  177 lb 1.6 oz (80.3 kg) 175 lb 11.2 oz (79.7 kg)    Height:  5' 10\" (1.778 m) 5' 10\" (1.778 m)                                               BP Readings from Last 3 Encounters:   10/11/21 136/69   08/18/21 (!) 161/102   07/10/21 (!) 156/94       NPO Status: Time of last liquid consumption: 2230                        Time of last solid consumption: 2230                        Date of last liquid consumption: 10/10/21                        Date of last solid food consumption: 10/10/21    BMI:   Wt Readings from Last 3 Encounters:   10/11/21 175 lb 11.2 oz (79.7 kg)   08/18/21 180 lb (81.6 kg)   07/10/21 180 lb (81.6 kg)     Body mass index is 25.21 kg/m².     CBC:   Lab Results   Component Value Date    WBC 6.6 10/10/2021    RBC 4.14 10/10/2021    HGB 13.6 10/10/2021    HCT 37.7 10/10/2021    MCV 91.1 10/10/2021    RDW 12.4 10/10/2021     10/10/2021       CMP:   Lab Results   Component Value Date     10/10/2021    K 3.9 10/10/2021    K 4.4 02/15/2019     10/10/2021    CO2 19 10/10/2021    BUN 12 10/10/2021    CREATININE 0.9 10/10/2021    GFRAA >60 10/10/2021    LABGLOM >60 10/10/2021    GLUCOSE 93 10/10/2021    PROT 7.5 10/10/2021    CALCIUM 9.6 10/10/2021 BILITOT 0.3 10/10/2021    ALKPHOS 71 10/10/2021    AST 34 10/10/2021    ALT 44 10/10/2021       POC Tests: No results for input(s): POCGLU, POCNA, POCK, POCCL, POCBUN, POCHEMO, POCHCT in the last 72 hours. Coags:   Lab Results   Component Value Date    PROTIME 11.4 02/15/2019    INR 1.0 02/15/2019    APTT 30.4 02/15/2019       HCG (If Applicable): No results found for: PREGTESTUR, PREGSERUM, HCG, HCGQUANT     ABGs: No results found for: PHART, PO2ART, ZHI2ACP, WIR3ZUK, BEART, D3GNTYEM     Type & Screen (If Applicable):  No results found for: LABABO, LABRH    Drug/Infectious Status (If Applicable):  No results found for: HIV, HEPCAB    COVID-19 Screening (If Applicable): No results found for: COVID19  XR Neck Soft Tissue 10/10/2021  No radiation information found for this patient   Narrative   EXAMINATION:   TWO XRAY VIEWS OF THE NECK SOFT TISSUES       10/10/2021 8:14 pm       COMPARISON:   None.       HISTORY:   ORDERING SYSTEM PROVIDED HISTORY: food stuck,   TECHNOLOGIST PROVIDED HISTORY:   Reason for exam:->food stuck,       FINDINGS:   AP and lateral views of the neck are submitted in soft tissue algorithm.       Cervical spine alignment is anatomic.       Normal atlanto dens interval and prevertebral soft tissues.       Normal epiglottis, aryepiglottic folds and air column.       No nick focal disc space narrowing or advanced osteophytosis.       No visualized food bolus or radiopaque foreign body at the hypopharyngeal or   visualized upper esophageal levels.       Mild soft tissue prominence projecting at the level of the posterior   oropharynx may relate to the right ear and/or relative prominence of the   palatine tonsils, suboptimally evaluated through the mandible on lateral view   and better evaluated clinically.       No soft tissue gas.           Impression   No acute specific abnormality is identified.       See discussion regarding soft tissue prominence at the posterior   oropharyngeal level. CXR 10/10/2021  Narrative   EXAMINATION:   ONE XRAY VIEW OF THE CHEST       10/10/2021 5:14 pm       COMPARISON:   None.       HISTORY:   ORDERING SYSTEM PROVIDED HISTORY: food bolus, short of breath   TECHNOLOGIST PROVIDED HISTORY:   Reason for exam:->food bolus, short of breath       FINDINGS:   No focal consolidation, pulmonary edema, pneumothorax, or significant pleural   effusion are seen.       The heart is not significantly enlarged.       No acute osseous abnormality is seen.       No evidence of radiopaque foreign body in the chest.           Impression   No acute disease. Anesthesia Evaluation  Patient summary reviewed and Nursing notes reviewed no history of anesthetic complications (denies history of complications): Airway: Mallampati: II  TM distance: >3 FB   Neck ROM: full  Mouth opening: > = 3 FB Dental:          Pulmonary: breath sounds clear to auscultation      (-) not a current smoker                          ROS comment: Esophageal stricture, history of esophageal dilation   Cardiovascular:    (+) hypertension:, hyperlipidemia        Rhythm: regular  Rate: normal           Beta Blocker:  Not on Beta Blocker         Neuro/Psych:   Negative Neuro/Psych ROS               ROS comment: Alcohol use 6 beers per day, right rotator cuff tear- limited mobility  GI/Hepatic/Renal:   (+) GERD (occasional, subsides with tums ): well controlled,           Endo/Other:                      ROS comment: Patient is a 49-year-old male presented to ER on 10/10/ 2021 with foreign body in his esophagus after eating lamb. States he has required multiple EGDs in the past and dilations due to similar episodes. Abdominal:       Abdomen: soft. Vascular: negative vascular ROS. Other Findings:           Anesthesia Plan      MAC and general     ASA 2     (18 gauge in right AC)  Induction: intravenous. Anesthetic plan and risks discussed with patient.       Plan discussed with

## 2021-10-11 NOTE — DISCHARGE SUMMARY
The patient was discharged on the same day as history and physical.  Please see history and physical as well as imaging studies, consult and evaluations, and nurse's notes.     Electronically signed by Micah Doran MD on 10/11/2021 at 2:26 PM

## 2021-10-11 NOTE — ED NOTES
Patient states it feels like the food has passed into his stomach. Appears slightly more calm. Family at bedside.      Nicolle Collado RN  10/10/21 2006

## 2021-10-11 NOTE — PROGRESS NOTES
Contact Dr.Ronci eunice barkley serve regarding discharge  Electronically signed by Madeline Castaneda RN on 10/11/2021 at 2:05 PM

## 2021-10-11 NOTE — PLAN OF CARE
Problem: Pain:  Goal: Pain level will decrease  Description: Pain level will decrease  10/11/2021 1109 by Carline Bergman RN  Outcome: Met This Shift  10/11/2021 0339 by Maryam Cazares RN  Outcome: Met This Shift  Goal: Control of acute pain  Description: Control of acute pain  10/11/2021 1109 by Carline Bergman RN  Outcome: Met This Shift  10/11/2021 0339 by Maryam Cazares RN  Outcome: Met This Shift  Goal: Control of chronic pain  Description: Control of chronic pain  Outcome: Met This Shift

## 2021-10-11 NOTE — ED PROVIDER NOTES
Pertinent positives and negatives are stated within HPI, all other systems reviewed and are negative. Past Medical History:  has a past medical history of Hypertension. Surgical History:  has a past surgical history that includes Appendectomy; Knee arthroscopy; sinus surgery; and pr egd flexible foreign body removal (N/A, 5/31/2018). Social History:  reports that he has never smoked. He has never used smokeless tobacco. He reports current alcohol use of about 6.0 standard drinks of alcohol per week. He reports that he does not use drugs. Family History: family history is not on file. Allergies: Patient has no known allergies. Physical Exam   Oxygen Saturation Interpretation: Normal.        ED Triage Vitals   BP Temp Temp Source Pulse Resp SpO2 Height Weight   10/10/21 1945 10/10/21 2318 10/10/21 2318 10/10/21 1945 10/10/21 1945 10/10/21 1945 10/11/21 0100 10/11/21 0100   137/78 98.5 °F (36.9 °C) Oral 89 28 96 % 5' 10\" (1.778 m) 177 lb 1.6 oz (80.3 kg)         Constitutional:  Alert, development consistent with age. Patient is in moderate to severe distress. He is retching and gasping for air at the same time. HEENT:  NC/NT. Airway patent. Posterior pharynx unremarkable. Neck:  Normal ROM. Supple. No stridor. Respiratory:  Clear to auscultation and breath sounds equal.  CV:  Regular rate and rhythm, normal heart sounds, without pathological murmurs, ectopy, gallops, or rubs. GI:  Abdomen Soft, nontender, good bowel sounds. No firm or pulsatile mass. Back:  No costovertebral tenderness. Integument:  Normal turgor. Warm, dry, without visible rash, unless noted elsewhere. Lymphatic: no lymphadenopathy noted  Neurological:  Oriented. Motor functions intact.     Lab / Imaging Results   (All laboratory and radiology results have been personally reviewed by myself)  Labs:  Results for orders placed or performed during the hospital encounter of 10/10/21   CBC Auto Differential   Result Value Ref Range    WBC 6.6 4.5 - 11.5 E9/L    RBC 4.14 3.80 - 5.80 E12/L    Hemoglobin 13.6 12.5 - 16.5 g/dL    Hematocrit 37.7 37.0 - 54.0 %    MCV 91.1 80.0 - 99.9 fL    MCH 32.9 26.0 - 35.0 pg    MCHC 36.1 (H) 32.0 - 34.5 %    RDW 12.4 11.5 - 15.0 fL    Platelets 811 472 - 582 E9/L    MPV 10.8 7.0 - 12.0 fL    Neutrophils % 33.7 (L) 43.0 - 80.0 %    Immature Granulocytes % 0.2 0.0 - 5.0 %    Lymphocytes % 53.5 (H) 20.0 - 42.0 %    Monocytes % 10.2 2.0 - 12.0 %    Eosinophils % 1.5 0.0 - 6.0 %    Basophils % 0.9 0.0 - 2.0 %    Neutrophils Absolute 2.21 1.80 - 7.30 E9/L    Immature Granulocytes # 0.01 E9/L    Lymphocytes Absolute 3.51 1.50 - 4.00 E9/L    Monocytes Absolute 0.67 0.10 - 0.95 E9/L    Eosinophils Absolute 0.10 0.05 - 0.50 E9/L    Basophils Absolute 0.06 0.00 - 0.20 E9/L   Comprehensive Metabolic Panel   Result Value Ref Range    Sodium 136 132 - 146 mmol/L    Potassium 3.9 3.5 - 5.0 mmol/L    Chloride 101 98 - 107 mmol/L    CO2 19 (L) 22 - 29 mmol/L    Anion Gap 16 7 - 16 mmol/L    Glucose 93 74 - 99 mg/dL    BUN 12 6 - 20 mg/dL    CREATININE 0.9 0.7 - 1.2 mg/dL    GFR Non-African American >60 >=60 mL/min/1.73    GFR African American >60     Calcium 9.6 8.6 - 10.2 mg/dL    Total Protein 7.5 6.4 - 8.3 g/dL    Albumin 4.5 3.5 - 5.2 g/dL    Total Bilirubin 0.3 0.0 - 1.2 mg/dL    Alkaline Phosphatase 71 40 - 129 U/L    ALT 44 (H) 0 - 40 U/L    AST 34 0 - 39 U/L   Urinalysis   Result Value Ref Range    Color, UA Yellow Straw/Yellow    Clarity, UA Clear Clear    Glucose, Ur Negative Negative mg/dL    Bilirubin Urine Negative Negative    Ketones, Urine Negative Negative mg/dL    Specific Gravity, UA <=1.005 1.005 - 1.030    Blood, Urine Negative Negative    pH, UA 7.0 5.0 - 9.0    Protein, UA Negative Negative mg/dL    Urobilinogen, Urine 0.2 <2.0 E.U./dL    Nitrite, Urine Negative Negative    Leukocyte Esterase, Urine Negative Negative   URINE DRUG SCREEN   Result Value Ref Range    Amphetamine Screen, Urine NOT DETECTED Negative <1000 ng/mL    Barbiturate Screen, Ur NOT DETECTED Negative < 200 ng/mL    Benzodiazepine Screen, Urine NOT DETECTED Negative < 200 ng/mL    Cannabinoid Scrn, Ur NOT DETECTED Negative < 50ng/mL    Cocaine Metabolite Screen, Urine NOT DETECTED Negative < 300 ng/mL    Opiate Scrn, Ur NOT DETECTED Negative < 300ng/mL    PCP Screen, Urine NOT DETECTED Negative < 25 ng/mL    Methadone Screen, Urine NOT DETECTED Negative <300 ng/mL    Oxycodone Urine NOT DETECTED Negative <100 ng/mL    FENTANYL SCREEN, URINE NOT DETECTED Negative <1 ng/mL    Drug Screen Comment: see below    Serum Drug Screen   Result Value Ref Range    Ethanol Lvl 95 mg/dL    Acetaminophen Level <5.0 (L) 10.0 - 17.5 mcg/mL    Salicylate, Serum <2.7 0.0 - 30.0 mg/dL     Imaging: All Radiology results interpreted by Radiologist unless otherwise noted. XR NECK SOFT TISSUE   Final Result   No acute specific abnormality is identified. See discussion regarding soft tissue prominence at the posterior   oropharyngeal level. XR CHEST PORTABLE   Final Result   No acute disease.            ED Course / Medical Decision Making     Medications   sodium chloride flush 0.9 % injection 5-40 mL (has no administration in time range)   sodium chloride flush 0.9 % injection 5-40 mL (has no administration in time range)   0.9 % sodium chloride infusion (has no administration in time range)   acetaminophen (TYLENOL) tablet 650 mg (has no administration in time range)   ondansetron (ZOFRAN-ODT) disintegrating tablet 4 mg (has no administration in time range)     Or   ondansetron (ZOFRAN) injection 4 mg (has no administration in time range)   aluminum & magnesium hydroxide-simethicone (MAALOX) 30 mL, lidocaine viscous hcl (XYLOCAINE) 5 mL (GI COCKTAIL) ( Oral Given 10/10/21 1590)   0.9 % sodium chloride bolus ( IntraVENous Canceled Entry 10/11/21 0104)        Re-examination:  10/11/21       Time: I came back to see patient after about 20 minutes from my initial exam and he reports he thinks that the food piece may have moved. He reports that the sensation is not quite as severe though he still feels a lot of pressure in his throat and upper esophageal area according to where he is indicating with his fingers. He still reports that he feels like the food is in there and that he can feel crackling and gurgling in his throat. After about an hour, patient reports he still feels that there is something stuck in his throat but has been calm ever since it initially seemed that is though it is moved. I did give him a drink water and he tips his head all the way back to swallow. He reports after he swallowed it he feels it percolating in his esophageal area. He did not regurgitate the water however. Consult(s):   IP CONSULT TO GI  IP CONSULT TO PRIMARY CARE PROVIDER    Procedure(s):   none    MDM:   Patient presents to emergency with esophageal foreign body. He has had this twice before in the past and had to have the foreign bodies removed and esophageal dilatation. Last time was 3 years ago. Patient reports over the last week or 2 he has had 2 or 3 close calls where he thought something was getting stuck in there. Tonight they had roasted some lamb and that is what he was eating when he got it stuck in his throat. When he initially came to the department he was in fairly severe distress but shortly after coming in he retched several times and then seemed suddenly improved thinking that the piece had moved. He still was having difficulty swallowing having to tip his head way back in order to swallow the water but no retching occurred since that time. I believe the piece is still stuck in there due to the way patient was drinking the water, he also drank his GI cocktail this way and reports he still feeling some gurgling in bubbling feeling when he drinks the water in his esophageal area.   I did speak with GI who recommends that patient be

## 2021-10-11 NOTE — H&P
Internal Medicine History & Physical     Name: Jose A Baker  : 1965  Chief Complaint: Other (esophageal collapse? hx of same )  Primary Care Physician: Mary Alcazar MD  Admission date: 10/10/2021  Date of service: 10/11/2021     History of Present Illness  Melody Tracy is a 64y.o. year old male with a PMH of hypertension. He presented with a chief complaint of \"lamb stuck in my throat\". Reports he was cooking acosta yesterday on the grill and took a bite. Reports the food \"got stuck in my throat\"--nothing would come up and nothing would go down. Reports associated symptom of dyspnea. States he had to lean his head back to take a sip of water \"to get it down\". States he has had \"near misses\" 5- 6 times this past week. Nothing improved his symptoms and nothing made them worse. He reportshe has a Hx of same in the past and reports he was last dilated in 2018 by Dr Fiona Breaux. Diagnostics performed in ER was essentially negative. Soft tissue of the neck was performed and demonstrated mild soft tissue prominence of the posterior oropharynx. He was admitted with foreign body in esophagus. Dr Fiona Breaux has been consulted. He is assessed while lying in bed, wife is present at bedside. He has been NPO and states he is tolerating his own secretions. Reports Dr Fiona Breaux was in earlier today and plan is for EGD with possible dilatation. Currently he is denying shortness of breath or chest pain. Past Medical History:   Diagnosis Date    Hypertension        Past Surgical History:   Procedure Laterality Date    APPENDECTOMY      KNEE ARTHROSCOPY      [place] EGD FLEXIBLE FOREIGN BODY REMOVAL N/A 2018    EGD FOREIGN BODY REMOVAL performed by Jodi Villarreal MD at [facility]       Family History:  Brother with a history of MI    Social History  Patient lives at home with his wife. At baseline patient ambulates independently  Illicit drugs: Denies   TOBACCO:   reports that he has never smoked. He has never used smokeless tobacco.  ETOH:   reports current alcohol use of about 6.0 standard drinks of alcohol per week. Home Medications  Prior to Admission medications    Medication Sig Start Date End Date Taking? Authorizing Provider   simvastatin (ZOCOR) 5 MG tablet Take 5 mg by mouth every morning    Yes Historical Provider, MD   lisinopril (PRINIVIL;ZESTRIL) 10 MG tablet TAKE 1 TABLET DAILY 7/20/20  Yes Manas Wang MD   allopurinol (ZYLOPRIM) 100 MG tablet TAKE 1 TABLET DAILY 5/18/20  Yes Manas Wang MD       Allergies  No Known Allergies    Review of Systems  Please see HPI above. All bolded are positive. All un-bolded are negative.   Constitutional Symptoms: fever, chills, fatigue, generalized weakness, diaphoresis, increase in thirst, loss of appetite  Eyes: vision change   Ears, Nose, Mouth, Throat: hearing loss, nasal congestion, sores in the mouth  Cardiovascular: chest pain, chest heaviness, palpitations  Respiratory: shortness of breath, wheezing, coughing  Gastrointestinal: abdominal pain, nausea, vomiting, diarrhea, constipation, melena, hematochezia, hematemesis,difficulty swollowing  Genitourinary: dysuria, hematuria, increased frequency  Musculoskeletal: lower extremity edema, myalgias, arthralgias, back pain  Integumentary: rashes, itching   Neurological: headache, lightheadedness, dizziness, confusion, syncope, numbness, tingling, focal weakness  Psychiatric: depression, suicidal ideation, anxiety  Endocrine: unintentional weight change  Hematologic/Lymphatic: lymphadenopathy, easy bruising, easy bleeding   Allergic/Immunologic: recurrent infections      Objective  VITALS:  /69   Pulse 62   Temp 97.7 °F (36.5 °C) (Oral)   Resp 16   Ht 5' 10\" (1.778 m)   Wt 175 lb 11.2 oz (79.7 kg)   SpO2 98%   BMI 25.21 kg/m²     Physical Exam:  General: awake, alert, oriented to person, place, time, and purpose, appears stated age, cooperative, no acute distress, pleasant, appropriate mood  Eyes: conjunctivae/corneas clear, sclera non icteric, EOMI  Ears: no obvious scars, no lesions, no masses, hearing intact  Mouth: mucous membranes moist, no obvious oral sores  Head: normocephalic, atraumatic  Neck: no JVD, no adenopathy, no thyromegaly, neck is supple, trachea is midline  Back: ROM normal, no CVA tenderness. Chest: no pain on palpation  Lungs: clear to auscultation bilaterally, without rhonchi, crackle, wheezing, or rale, no retractions or use of accessory muscles  Heart: regular rate and regular rhythm, no murmur, normal S1, S2  Abdomen: soft, non-tender; bowel sounds normal; no masses, no organomegaly  : Deferred   Extremities: no lower extremity edema, extremities atraumatic, no cyanosis, no clubbing, 2+ pedal pulses palpated  Skin: normal color, normal texture, normal turgor, no rashes, no lesions  Neurologic:5/5 muscle strength throughout, normal muscle tone throughout, face symmetric, hearing intact, tongue midline, speech appropriate without slurring, sensation to fine touch intact in upper and lower extremities    Labs-   Lab Results   Component Value Date    WBC 6.6 10/10/2021    HGB 13.6 10/10/2021    HCT 37.7 10/10/2021     10/10/2021     10/10/2021    K 3.9 10/10/2021     10/10/2021    CREATININE 0.9 10/10/2021    BUN 12 10/10/2021    CO2 19 (L) 10/10/2021    GLUCOSE 93 10/10/2021    ALT 44 (H) 10/10/2021    AST 34 10/10/2021    INR 1.0 02/15/2019        Ref.  Range 10/10/2021 22:46   Amphetamine Screen, Urine Latest Ref Range: Negative <1000 ng/mL  NOT DETECTED   Benzodiazepine Screen, Urine Latest Ref Range: Negative < 200 ng/mL  NOT DETECTED   Cocaine Metabolite Screen, Urine Latest Ref Range: Negative < 300 ng/mL  NOT DETECTED   FENTANYL SCREEN, URINE Latest Ref Range: Negative <1 ng/mL  NOT DETECTED   Methadone Screen, Urine Latest Ref Range: Negative <300 ng/mL  NOT DETECTED   Opiate Scrn, Ur Latest Ref Range: Negative < 300ng/mL  NOT DETECTED   Oxycodone Urine Latest Ref Range: Negative <100 ng/mL  NOT DETECTED   PCP Screen, Urine Latest Ref Range: Negative < 25 ng/mL  NOT DETECTED   Cannabinoid Scrn, Ur Latest Ref Range: Negative < 50ng/mL  NOT DETECTED      Ref. Range 10/10/2021 22:46   Ethanol Lvl Latest Units: mg/dL 95       Recent Radiological Studies:  XR NECK SOFT TISSUE   Final Result   No acute specific abnormality is identified. See discussion regarding soft tissue prominence at the posterior   oropharyngeal level. XR CHEST PORTABLE   Final Result   No acute disease. Assessment  Active Hospital Problems    Diagnosis     Esophageal foreign body, initial encounter [R61.562U]      Priority: High    History of esophageal stricture [Z87.19]     History of esophageal dilatation [Z98.890]     Statin intolerance [Z78.9]     Herpetic lesion [B00.9]     Hypertension [I10]        Patient Active Problem List    Diagnosis Date Noted    Esophageal foreign body, initial encounter 10/10/2021     Priority: High    History of esophageal stricture 10/11/2021    History of esophageal dilatation 10/11/2021    Statin intolerance 11/15/2018    Herpetic lesion 11/15/2018    Hypertension 12/22/2015       Plan  · Esophageal foreign body   · GI has been consulted   · EGD revealed there is moderate esophagitis but the stricture is not tight  · PPI 3 to 4 weeks then return for dilation  · Hx of strictures sp dilatations   · HTN  · Meds on hold at this time D/T NPO status. · BP stable at this time  · PT/OT  · Consults [facility]  · Home medications to be reconciled and confirmed prior to being ordered  · Code Status full code  · Discharge plan: TBD pending clinical improvement     Michelle SOW-DONA  10/11/2021  8:30 AM  I can be reached through P[employer]book. NOTE:  This report was transcribed using voice recognition software.   Every effort was made to ensure accuracy; however, inadvertent computerized transcription errors may be present. Addendum: I have personally participated in a face-to-face history and physical exam on the date of service with the patient. I have discussed the case with the nurse practitioner in detail. I participated in medical decision making on the date of service and I agree with all of the pertinent clinical information unless indicated in my editing of the note. I have reviewed and edited the note above based on my findings during my history, exam, and decision making on the same day of service. The vitals, labs, microbiology, imaging and current medications/treatments were reviewed by myself in addition to with the nurse practitioner. I agree with the above documentation and treatment plan. Electronically signed by Chiara White MD on 10/11/2021 at 2:25 PM    I can be reached through [facility].

## 2021-10-11 NOTE — PROGRESS NOTES
There is moderate esophagitis but the stricture is not tight  Would rx PPI 3 to 4 weeks then return for dilation  OK home

## 2021-10-12 NOTE — ANESTHESIA POSTPROCEDURE EVALUATION
Department of Anesthesiology  Postprocedure Note    Patient: Abdulkadir Denton  MRN: [id number]  [place]: 1965  Date of evaluation: 10/12/2021  Time:  10:07 AM     Procedure Summary     Date: 10/11/21 Room / Location: [facility] [address] / [address] [place] [address]    Anesthesia Start: 1237 Anesthesia Stop: 2507    Procedure: EGD DIAGNOSTIC ONLY Diagnosis: (/)    Surgeons: Tanis Severs, MD Responsible Provider: Saman Shahid MD    Anesthesia Type: MAC, general ASA Status: 2          Anesthesia Type: MAC, general    Zoe Phase I: Zoe Score: 10    Zoe Phase II: Zoe Score: 10    Last vitals: Reviewed and per EMR flowsheets.        Anesthesia Post Evaluation    Patient location during evaluation: PACU  Patient participation: complete - patient participated  Level of consciousness: awake  Airway patency: patent  Nausea & Vomiting: no nausea and no vomiting  Complications: no  Cardiovascular status: hemodynamically stable  Respiratory status: acceptable  Hydration status: stable

## 2021-10-16 NOTE — OP NOTE
[address] [address]                                OPERATIVE REPORT    PATIENT NAME: Meron Goodson                        :        1965  MED REC NO:   [id number]                            ROOM:       0501  ACCOUNT NO:   [de-identified]                           ADMIT DATE: 10/10/2021  PROVIDER:     Veena Howard MD    DATE OF PROCEDURE:  10/11/2021    PROCEDURE PERFORMED:  Esophagogastroduodenoscopy. PREOPERATIVE DIAGNOSES:  History of a mild distal esophageal stricture,  symptoms of an esophageal meat impaction. POSTOPERATIVE DIAGNOSES:  1.  No meat impaction. 2.  Esophagitis with a ring-like narrowing, which appears identical to  that previously. 3.  Normal stomach and duodenum. INDICATIONS:  He is 64years of age. He presented to the emergency room  with ingestion of a piece of lamb and apparently pain and inability to  handle secretions. When I saw him, his secretions were being handled  well, but he still felt like something was lodged. That was 7 a.m. in  the morning. Endoscopy could not be scheduled until 2 in the afternoon. He told Anesthesia he thought it had passed. Preop is monitored anesthesia care. DESCRIPTION OF PROCEDURE:  The patient was brought to the OR apparently  anticipating esophageal intubation or endotracheal intubation, but it  was decided [facility]. When he was sedated in the left lateral, the Olympus  GIF-H190 video endoscope was inserted into the cervical esophagus and  passed distally. There was no fluid. No foreign body. There was a  degree of peptic esophagitis, but it was not severe. There was a  ring-like narrowing proximal to the squamocolumnar transition.    Photographs in comparison to that previously noted were identical.   Years ago, with his first esophageal dilatation, he had rather severe  esophagitis and more of prominent luminal compromise, which is no longer  apparent. Stomach and duodenum were normal.  The endoscope was  withdrawn. He does have some linear erosions at the GE junction. Terminated and well tolerated. IMPRESSION:  At this point,  will treat him with proton pump inhibitor  and bring him back in three to four weeks to electively dilate. It is  not that tight, he should be able tolerate most foods. He had an alcohol  level above legal intoxication on admission. That was probably a  contributing factor.         Lupillo Flor MD    D: 10/15/2021 13:50:59       T: 10/15/2021 13:54:36     [id number]  Job#: [id number]     Doc#: [id number]    CC:

## 2022-01-21 ENCOUNTER — HOSPITAL ENCOUNTER (OUTPATIENT)
Dept: NON INVASIVE DIAGNOSTICS | Age: 57
Discharge: HOME | End: 2022-01-21

## 2022-01-24 ENCOUNTER — HOSPITAL ENCOUNTER (OUTPATIENT)
Dept: NON INVASIVE DIAGNOSTICS | Age: 57
Discharge: HOME | End: 2022-01-24
Payer: COMMERCIAL

## 2022-01-24 ENCOUNTER — HOSPITAL ENCOUNTER (OUTPATIENT)
Age: 57
Discharge: HOME | End: 2022-01-24
Payer: COMMERCIAL

## 2022-01-24 LAB
ANION GAP SERPL CALCULATED.3IONS-SCNC: 11 MMOL/L (ref 7–16)
CHLORIDE BLD-SCNC: 103 MMOL/L (ref 98–107)
CO2: 25 MMOL/L (ref 22–29)
EKG ATRIAL RATE: 69 BPM
EKG P AXIS: 52 DEGREES
EKG P-R INTERVAL: 154 MS
EKG Q-T INTERVAL: 400 MS
EKG QRS DURATION: 98 MS
EKG QTC CALCULATION (BAZETT): 428 MS
EKG R AXIS: -7 DEGREES
EKG T AXIS: 10 DEGREES
EKG VENTRICULAR RATE: 69 BPM
HCT VFR BLD CALC: 38.8 % (ref 37–54)
HEMOGLOBIN: 13.1 G/DL (ref 12.5–16.5)
POTASSIUM SERPL-SCNC: 4.6 MMOL/L (ref 3.5–5)
SODIUM BLD-SCNC: 139 MMOL/L (ref 132–146)

## 2022-01-24 PROCEDURE — 80051 ELECTROLYTE PANEL: CPT

## 2022-01-24 PROCEDURE — 93005 ELECTROCARDIOGRAM TRACING: CPT | Performed by: ORTHOPAEDIC SURGERY

## 2022-01-24 PROCEDURE — 85014 HEMATOCRIT: CPT

## 2022-01-24 PROCEDURE — 36415 COLL VENOUS BLD VENIPUNCTURE: CPT

## 2022-01-24 PROCEDURE — 85018 HEMOGLOBIN: CPT

## 2023-11-21 ENCOUNTER — HOSPITAL ENCOUNTER (OUTPATIENT)
Age: 58
Discharge: HOME | End: 2023-11-23

## 2023-11-27 LAB — SURGICAL PATHOLOGY REPORT: NORMAL

## (undated) DEVICE — THE DISPOSABLE ROTH NET FOREIGN BODY STANDARD RETRIEVAL DEVICE IS USED IN THE ENDOSCOPIC RETRIEVAL OF FOREIGN BODY, FOOD BOLUS AND EXCISED TISSUE SUCH AS POLYPS.: Brand: ROTH NET

## (undated) DEVICE — BLOCK BITE 60FR RUBBER ADLT DENTAL

## (undated) DEVICE — SPONGE GZ W4XL4IN RAYON POLY FILL CVR W/ NONWOVEN FAB

## (undated) DEVICE — GRADUATE TRIANG MEASURE 1000ML BLK PRNT